# Patient Record
Sex: MALE | Race: BLACK OR AFRICAN AMERICAN | NOT HISPANIC OR LATINO | Employment: FULL TIME | URBAN - METROPOLITAN AREA
[De-identification: names, ages, dates, MRNs, and addresses within clinical notes are randomized per-mention and may not be internally consistent; named-entity substitution may affect disease eponyms.]

---

## 2017-01-24 VITALS
HEIGHT: 77 IN | DIASTOLIC BLOOD PRESSURE: 86 MMHG | SYSTOLIC BLOOD PRESSURE: 136 MMHG | TEMPERATURE: 95.8 F | BODY MASS INDEX: 37.19 KG/M2 | WEIGHT: 315 LBS | HEART RATE: 59 BPM | RESPIRATION RATE: 20 BRPM

## 2017-01-25 ENCOUNTER — OFFICE VISIT (OUTPATIENT)
Dept: BARIATRICS/WEIGHT MGMT | Facility: CLINIC | Age: 47
End: 2017-01-25

## 2017-01-25 VITALS
SYSTOLIC BLOOD PRESSURE: 120 MMHG | BODY MASS INDEX: 34.12 KG/M2 | DIASTOLIC BLOOD PRESSURE: 74 MMHG | OXYGEN SATURATION: 100 % | RESPIRATION RATE: 18 BRPM | WEIGHT: 289 LBS | HEIGHT: 77 IN | TEMPERATURE: 97.7 F | HEART RATE: 62 BPM

## 2017-01-25 DIAGNOSIS — R79.89 ELEVATED LFTS: ICD-10-CM

## 2017-01-25 DIAGNOSIS — Z98.84 STATUS POST BARIATRIC SURGERY: Primary | ICD-10-CM

## 2017-01-25 DIAGNOSIS — E66.01 MORBID OBESITY DUE TO EXCESS CALORIES (HCC): ICD-10-CM

## 2017-01-25 DIAGNOSIS — E55.9 VITAMIN D DEFICIENCY: ICD-10-CM

## 2017-01-25 DIAGNOSIS — I10 ESSENTIAL HYPERTENSION: ICD-10-CM

## 2017-01-25 DIAGNOSIS — R53.83 OTHER FATIGUE: ICD-10-CM

## 2017-01-25 PROCEDURE — 99214 OFFICE O/P EST MOD 30 MIN: CPT | Performed by: PHYSICIAN ASSISTANT

## 2017-01-25 RX ORDER — RANITIDINE 150 MG/1
150 CAPSULE ORAL EVERY EVENING
Qty: 30 CAPSULE | Refills: 0 | Status: SHIPPED | OUTPATIENT
Start: 2017-01-25 | End: 2017-02-24

## 2017-01-25 RX ORDER — OMEPRAZOLE 40 MG/1
CAPSULE, DELAYED RELEASE ORAL
COMMUNITY
Start: 2017-01-06 | End: 2017-12-18

## 2017-01-25 RX ORDER — URSODIOL 300 MG/1
CAPSULE ORAL
COMMUNITY
Start: 2017-01-24 | End: 2017-05-15

## 2017-01-25 RX ORDER — ERGOCALCIFEROL 1.25 MG/1
CAPSULE ORAL
COMMUNITY
Start: 2016-12-09 | End: 2017-12-18

## 2017-01-25 NOTE — MR AVS SNAPSHOT
Gerardo Verduzco   1/25/2017 9:00 AM   Office Visit    Dept Phone:  [phone]   Encounter #:  [id number]    Provider:  WALI Bautista   Department:  [facility] BARIATRIC SURGERY                Your Full Care Plan              Today's Medication Changes          These changes are accurate as of: 1/25/17  9:33 AM.  If you have any questions, ask your nurse or doctor.               New Medication(s)Ordered:     ranitidine 150 MG capsule   Commonly known as:  ZANTAC   Take 1 capsule by mouth Every Evening for 30 days.   Started by:  WALI Bautista         Stop taking medication(s)listed here:     metFORMIN  MG 24 hr tablet   Commonly known as:  GLUCOPHAGE-XR   Stopped by:  WALI Bautista                Where to Get Your Medications      These medications were sent to [facility] [address], [place] - [address] [facility] AT [facility] [address] - [phone]  - [phone] FX  [address] [facility] [address]     Phone:  [phone]     ranitidine 150 MG capsule                  Your Updated Medication List          This list is accurate as of: 1/25/17  9:33 AM.  Always use your most recent med list.                lisinopril 10 MG tablet   Commonly known as:  PRINIVIL,ZESTRIL   Take 1 tablet by mouth Daily.       MULTIVITAMIN ADULT PO       ranitidine 150 MG capsule   Commonly known as:  ZANTAC   Take 1 capsule by mouth Every Evening for 30 days.       Vitamin D (Cholecalciferol) 400 UNITS tablet   Commonly known as:  CHOLECALCIFEROL               We Performed the Following     CBC (No Diff)     Comprehensive Metabolic Panel     Ferritin     Folate     Iron     Methylmalonic Acid, Serum     Prealbumin     Vitamin B1, Whole Blood     Vitamin D 25 Hydroxy       You Were Diagnosed With        Codes Comments    Status post bariatric surgery    -  Primary ICD-10-CM: Z98.84  ICD-9-CM: V45.86     Morbid obesity due to excess calories      "ICD-10-CM: E66.01  ICD-9-CM: 278.01     Other fatigue     ICD-10-CM: R53.83  ICD-9-CM: 780.79     Elevated LFTs     ICD-10-CM: R79.89  ICD-9-CM: 790.6     Essential hypertension     ICD-10-CM: I10  ICD-9-CM: 401.9     Vitamin D deficiency     ICD-10-CM: E55.9  ICD-9-CM: 268.9       Instructions     None    Patient Instructions History      Upcoming Appointments     Visit Type Date Time Department    FOLLOW UP 2017  9:00 AM [facility]    FOLLOW UP 2017  9:30 AM [facility]      [facility] Signup     [facility] vzaar allows you to send messages to your doctor, view your test results, renew your prescriptions, schedule appointments, and more. To sign up, go to [employer] and click on the Sign Up Now link in the New User? box. Enter your vzaar Activation Code exactly as it appears below along with the last four digits of your Social Security Number and your Date of Birth () to complete the sign-up process. If you do not sign up before the expiration date, you must request a new code.    vzaar Activation Code: [email or web address]  Expires: 2017  9:31 AM    If you have questions, you can email [email or web address] or call [phone] to talk to our vzaar staff. Remember, vzaar is NOT to be used for urgent needs. For medical emergencies, dial 911.               Other Info from Your Visit           Your Appointments     2017  9:30 AM EDT   Follow Up with WALI Bautista   [facility] BARIATRIC SURGERY (--)    [address]  [facility] [address] [phone]           Arrive 15 minutes prior to appointment.              Allergies     Penicillins Allergy Swelling    throat      Vital Signs     Blood Pressure Pulse Temperature Respirations Height    120/74 (BP Location: Left arm, Patient Position: Sitting, Cuff Size: Large Adult) 62 97.7 °F (36.5 °C) (Temporal Artery ) 18 77\" (195.6 cm)    Weight Oxygen " Saturation Body Mass Index Smoking Status       289 lb (131 kg) 100% 34.27 kg/m2 Never Smoker       Problems and Diagnoses Noted     Diabetes    Elevated LFTs    Tiredness    Acid reflux disease    High cholesterol or triglycerides    High blood pressure    Joint pain    Status following surgery for weight loss    -  Primary    Severe obesity        Vitamin D deficiency

## 2017-01-25 NOTE — PROGRESS NOTES
[facility] BARIATRIC SURGERY  [address]  [facility] [address]  [phone]    Gerardo Verduzco.  1970    Date of visit: 1/25/2017    REASON FOR VISIT:  3 month postop      HPI: Gerardo Verduzco is a 46 y.o. male 3 months s/p LSG performed by Dr. Christiansen on 10/26/16.  Overall feeling good. His only issue is night time reflux. Had f/up w/PCP, took pt off all DM meds. Gerardo states that  he is eating  grams of protein per day. He reports eating 3 meals per day,2 snacks per day, drinking 64-oz. of water per day, no carbonated beverage consumption and exercising regularly. Gerardo is taking Omeprazole  and Actigall .   Taking  MVI, B12, B1, Vit D and iron.  1 month labs revealed low vit D , o/w okay. Pre-surgery weight: 362 lbs. Today's weight is 289 lb (131 kg) pounds, today's BMI is Body mass index is 34.27 kg/(m^2)., he has a  Loss of 28 pounds since the last visit and his weight loss since surgery is 73 pounds.      Past Medical History   Diagnosis Date   • Diabetes mellitus      resolved after WLS, DX 2013, on insulin 7 months, A1C 11.5 7/16   • Elevated LFTs      AST 47, 49   • Fatigue    • GERD (gastroesophageal reflux disease)      on Omeprazole controlled . EGD GDW 8/16 very thick mucosal folds, 45 cm, no HH. path DE suggestive of reflux   • Goiter      Undx   • Hyperlipidemia    • Hypertension    • Joint pain    • Morbid obesity    • Wears eyeglasses      Past Surgical History   Procedure Laterality Date   • Nasal septum surgery     • Appendectomy     • Colonoscopy       3 years ago   • Gastric sleeve laparoscopic N/A 10/26/2016     Procedure: GASTRIC SLEEVE LAPAROSCOPIC;  Surgeon: Abdifatah Christiansen MD;  Location: [facility];  Service:        Current Outpatient Prescriptions:   •  lisinopril (PRINIVIL,ZESTRIL) 10 MG tablet, Take 1 tablet by mouth Daily., Disp: 20 tablet, Rfl: 0  •  Multiple Vitamins-Minerals (MULTIVITAMIN ADULT PO), Take 1 tablet by mouth Daily., Disp: ,  "Rfl:   •  omeprazole (priLOSEC) 40 MG capsule, , Disp: , Rfl:   •  ranitidine (ZANTAC) 150 MG capsule, Take 1 capsule by mouth Every Evening for 30 days., Disp: 30 capsule, Rfl: 0  •  ursodiol (ACTIGALL) 300 MG capsule, , Disp: , Rfl:   •  vitamin D (ERGOCALCIFEROL) 68816 UNITS capsule capsule, , Disp: , Rfl:   •  Vitamin D, Cholecalciferol, (CHOLECALCIFEROL) 400 UNITS tablet, Take 400 Units by mouth Daily., Disp: , Rfl:   Allergies   Allergen Reactions   • Penicillins Swelling     throat     Social History     Social History   • Marital status: Single     Spouse name: N/A   • Number of children: N/A   • Years of education: N/A     Occupational History   • Not on file.     Social History Main Topics   • Smoking status: Never Smoker   • Smokeless tobacco: Never Used   • Alcohol use No   • Drug use: No   • Sexual activity: Not on file      Comment:      Other Topics Concern   • Not on file     Social History Narrative    Lives in [place] w/.     Working full time manager at [employer]     Review of Systems   General ROS: Negative for - fatigue  Ophthalmic ROS: Negative for - vision changes  ENT ROS: Negative for - swallowing difficulty  Gastrointestinal ROS: Negative for - abdominal pain, constipation, diarrhea, nausea/vomiting  Neurological ROS: Negative for - dizziness, numbness/tingling of extremities, memory loss  Dermatological ROS: Negative for - Hair loss     Physical Exam:  Visit Vitals   • /74 (BP Location: Left arm, Patient Position: Sitting, Cuff Size: Large Adult)   • Pulse 62   • Temp 97.7 °F (36.5 °C) (Temporal Artery )   • Resp 18   • Ht 77\" (195.6 cm)   • Wt 289 lb (131 kg)  Comment: pre-op weight 362 lbs   • SpO2 100%   • BMI 34.27 kg/m2        General Appearance:  Well nourished.  In no acute distress.  Patient was observed to be obese.  Oral Cavity:   Buccal Mucosa: The buccal mucosa was moist.  Lungs:  Normal breath sounds/voice " sounds.  Cardiovascular:   Heart Rate And Rhythm: Heart rate and rhythm normal.   Edema: No calf tenderness.  Abdomen: incisions well healed.   Auscultation: The bowel sounds were normal.   Palpation: No mass was palpated in the abdomen, Soft, nontender, and nondistended.   Hernia: No hernia was discovered.  Musculoskeletal System:   General/bilateral: No edema present in extremities.  Normal movement of all extremities.  Neurological:  Was alert and oriented.   Gait And Stance: Gait and stance were normal.  Psychiatric:   Attitude: The attitude was cooperative.   Mood: Mood pleasant.  Skin:  The complexion was normal.  The skin moisture was normal and the skin temperature was normal.    Assessment:   3 months s/p LSG, the patient is doing well.     ICD-10-CM ICD-9-CM   1. Status post bariatric surgery Z98.84 V45.86   2. Morbid obesity due to excess calories E66.01 278.01   3. Other fatigue R53.83 780.79   4. Elevated LFTs R79.89 790.6   5. Essential hypertension I10 401.9   6. Vitamin D deficiency E55.9 268.9     Plan:     Orders Placed This Encounter   Procedures   • CBC (No Diff)   • Comprehensive Metabolic Panel   • Ferritin   • Folate   • Iron   • Methylmalonic Acid, Serum   • Prealbumin   • Vitamin B1, Whole Blood   • Vitamin D 25 Hydroxy     Requested Prescriptions     Signed Prescriptions Disp Refills   • ranitidine (ZANTAC) 150 MG capsule 30 capsule 0     Sig: Take 1 capsule by mouth Every Evening for 30 days.     · Advised to take Zantac QPM, call if no improvement gerd sx.    Diet and Exercise: Diet history reviewed and discussed with the patient. Weight loss to date discussed with the patient. Recommended patient be sure to get at least 70 grams of protein per day by eating small, frequent meals all with high lean protein choices. Be sure to limit/cut back on daily carbohydrate intake. Discussed with the patient the recommended amount of water per day to intake- half of body weight in ounces. Reviewed  vitamin requirements. Be sure to do routine exercise, 150 minutes per week minimum, including both cardio and strength training.   • Recommended and instructed to call the office for concerns, questions, or problems.   • The patient was instructed to follow up in 3 months  note: approx 15 of the 25 minute visit was spent counseling on dietary/lifestyle modifications    Tesha Wagner, PAC  1/25/2017

## 2017-01-29 LAB
25(OH)D3+25(OH)D2 SERPL-MCNC: 32.4 NG/ML
ALBUMIN SERPL-MCNC: 4 G/DL (ref 3.2–4.8)
ALBUMIN/GLOB SERPL: 1.3 G/DL (ref 1.5–2.5)
ALP SERPL-CCNC: 60 U/L (ref 25–100)
ALT SERPL-CCNC: 24 U/L (ref 7–40)
AST SERPL-CCNC: 30 U/L (ref 0–33)
BILIRUB SERPL-MCNC: 0.5 MG/DL (ref 0.3–1.2)
BUN SERPL-MCNC: 18 MG/DL (ref 9–23)
BUN/CREAT SERPL: 18 (ref 7–25)
CALCIUM SERPL-MCNC: 10 MG/DL (ref 8.7–10.4)
CHLORIDE SERPL-SCNC: 105 MMOL/L (ref 99–109)
CO2 SERPL-SCNC: 34 MMOL/L (ref 20–31)
CREAT SERPL-MCNC: 1 MG/DL (ref 0.6–1.3)
ERYTHROCYTE [DISTWIDTH] IN BLOOD BY AUTOMATED COUNT: 13.8 % (ref 11.3–14.5)
FERRITIN SERPL-MCNC: 112 NG/ML (ref 22–322)
FOLATE SERPL-MCNC: 7.06 NG/ML (ref 3.2–20)
GLOBULIN SER CALC-MCNC: 3 GM/DL
GLUCOSE SERPL-MCNC: 105 MG/DL (ref 70–100)
HCT VFR BLD AUTO: 44.2 % (ref 38.9–50.9)
HGB BLD-MCNC: 14.3 G/DL (ref 13.1–17.5)
IRON SERPL-MCNC: 115 MCG/DL (ref 50–175)
MCH RBC QN AUTO: 29.5 PG (ref 27–31)
MCHC RBC AUTO-ENTMCNC: 32.4 G/DL (ref 32–36)
MCV RBC AUTO: 91.3 FL (ref 80–99)
METHYLMALONATE SERPL-SCNC: 193 NMOL/L (ref 0–378)
PLATELET # BLD AUTO: 135 10*3/MM3 (ref 150–450)
POTASSIUM SERPL-SCNC: 5.3 MMOL/L (ref 3.5–5.5)
PREALB SERPL-MCNC: 17 MG/DL (ref 12–34)
PROT SERPL-MCNC: 7 G/DL (ref 5.7–8.2)
RBC # BLD AUTO: 4.84 10*6/MM3 (ref 4.2–5.76)
SODIUM SERPL-SCNC: 143 MMOL/L (ref 132–146)
VIT B1 BLD-SCNC: 107 NMOL/L (ref 66.5–200)
WBC # BLD AUTO: 8.14 10*3/MM3 (ref 3.5–10.8)

## 2017-02-27 DIAGNOSIS — R10.13 DYSPEPSIA: Primary | ICD-10-CM

## 2017-02-27 RX ORDER — RANITIDINE 150 MG/1
150 TABLET ORAL 2 TIMES DAILY
Status: DISCONTINUED | OUTPATIENT
Start: 2017-02-27 | End: 2017-03-08

## 2017-03-08 DIAGNOSIS — R10.13 DYSPEPSIA: ICD-10-CM

## 2017-03-08 RX ORDER — RANITIDINE 150 MG/1
150 CAPSULE ORAL 2 TIMES DAILY
Qty: 60 CAPSULE | Refills: 2 | Status: SHIPPED | OUTPATIENT
Start: 2017-03-08 | End: 2017-10-05

## 2017-03-15 ENCOUNTER — TELEPHONE (OUTPATIENT)
Dept: BARIATRICS/WEIGHT MGMT | Facility: CLINIC | Age: 47
End: 2017-03-15

## 2017-03-15 NOTE — TELEPHONE ENCOUNTER
Pt called in and stated that he is working out heavily and getting tired in the process.  Pt is wanting to know if he can take a pre-workout powder before hand to help with the fatigue of exercising.  Please advise thanks

## 2017-03-20 NOTE — TELEPHONE ENCOUNTER
Spoke with patient advised we don't recommend any protein powders or supplements, but that he needed to make sure he was getting in his 100 g of protein per day. Advised patient that we could schedule him for a BMR test to make sure he was getting enough calories per day. Patient asked to have this done, transferred call to  to have BMR scheduled

## 2017-05-15 ENCOUNTER — OFFICE VISIT (OUTPATIENT)
Dept: BARIATRICS/WEIGHT MGMT | Facility: CLINIC | Age: 47
End: 2017-05-15

## 2017-05-15 VITALS
RESPIRATION RATE: 18 BRPM | TEMPERATURE: 97.7 F | HEIGHT: 77 IN | DIASTOLIC BLOOD PRESSURE: 74 MMHG | OXYGEN SATURATION: 99 % | SYSTOLIC BLOOD PRESSURE: 108 MMHG | HEART RATE: 88 BPM | BODY MASS INDEX: 29.81 KG/M2 | WEIGHT: 252.5 LBS

## 2017-05-15 DIAGNOSIS — R79.89 ELEVATED LFTS: ICD-10-CM

## 2017-05-15 DIAGNOSIS — R53.83 FATIGUE, UNSPECIFIED TYPE: Primary | ICD-10-CM

## 2017-05-15 DIAGNOSIS — E55.9 VITAMIN D DEFICIENCY: ICD-10-CM

## 2017-05-15 PROCEDURE — 99214 OFFICE O/P EST MOD 30 MIN: CPT | Performed by: PHYSICIAN ASSISTANT

## 2017-05-18 LAB
25(OH)D3+25(OH)D2 SERPL-MCNC: 19.1 NG/ML (ref 30–100)
ALBUMIN SERPL-MCNC: 3.9 G/DL (ref 3.5–5.5)
ALBUMIN/GLOB SERPL: 1.1 {RATIO} (ref 1.2–2.2)
ALP SERPL-CCNC: 69 IU/L (ref 39–117)
ALT SERPL-CCNC: 26 IU/L (ref 0–44)
AST SERPL-CCNC: 49 IU/L (ref 0–40)
BILIRUB SERPL-MCNC: 0.4 MG/DL (ref 0–1.2)
BUN SERPL-MCNC: 19 MG/DL (ref 6–24)
BUN/CREAT SERPL: 18 (ref 9–20)
CALCIUM SERPL-MCNC: 9.5 MG/DL (ref 8.7–10.2)
CHLORIDE SERPL-SCNC: 102 MMOL/L (ref 96–106)
CO2 SERPL-SCNC: 27 MMOL/L (ref 18–29)
CREAT SERPL-MCNC: 1.08 MG/DL (ref 0.76–1.27)
ERYTHROCYTE [DISTWIDTH] IN BLOOD BY AUTOMATED COUNT: 13.8 % (ref 12.3–15.4)
FERRITIN SERPL-MCNC: 121 NG/ML (ref 30–400)
FOLATE SERPL-MCNC: 8 NG/ML
GLOBULIN SER CALC-MCNC: 3.5 G/DL (ref 1.5–4.5)
GLUCOSE SERPL-MCNC: 90 MG/DL (ref 65–99)
HCT VFR BLD AUTO: 43.9 % (ref 37.5–51)
HGB BLD-MCNC: 14.8 G/DL (ref 12.6–17.7)
IRON SERPL-MCNC: 85 UG/DL (ref 38–169)
MCH RBC QN AUTO: 29.2 PG (ref 26.6–33)
MCHC RBC AUTO-ENTMCNC: 33.7 G/DL (ref 31.5–35.7)
MCV RBC AUTO: 87 FL (ref 79–97)
METHYLMALONATE SERPL-SCNC: 308 NMOL/L (ref 0–378)
PLATELET # BLD AUTO: 108 X10E3/UL (ref 150–379)
POTASSIUM SERPL-SCNC: 5.1 MMOL/L (ref 3.5–5.2)
PREALB SERPL-MCNC: 21 MG/DL (ref 12–34)
PROT SERPL-MCNC: 7.4 G/DL (ref 6–8.5)
RBC # BLD AUTO: 5.07 X10E6/UL (ref 4.14–5.8)
SODIUM SERPL-SCNC: 143 MMOL/L (ref 134–144)
VIT B1 BLD-SCNC: 96.2 NMOL/L (ref 66.5–200)
WBC # BLD AUTO: 9.4 X10E3/UL (ref 3.4–10.8)

## 2017-05-19 RX ORDER — ERGOCALCIFEROL 1.25 MG/1
50000 CAPSULE ORAL WEEKLY
Qty: 12 CAPSULE | Refills: 0 | Status: SHIPPED | OUTPATIENT
Start: 2017-05-19 | End: 2017-12-18

## 2017-06-23 RX ORDER — URSODIOL 300 MG/1
300 CAPSULE ORAL 2 TIMES DAILY
Qty: 60 CAPSULE | Refills: 0 | OUTPATIENT
Start: 2017-06-23

## 2017-06-23 NOTE — TELEPHONE ENCOUNTER
Contacted pt and let him know that the rx will not be refilled.  Pt stated he felt he didn't need it, he just thought he was supposed to take it.  I stated to pt that if he has any problems not having this med. To contact our office for possible further eval.  Pt verbalized understanding.

## 2017-07-26 ENCOUNTER — TELEPHONE (OUTPATIENT)
Dept: BARIATRICS/WEIGHT MGMT | Facility: CLINIC | Age: 47
End: 2017-07-26

## 2017-07-26 RX ORDER — RANITIDINE 150 MG/1
150 TABLET ORAL 2 TIMES DAILY
Qty: 180 TABLET | Refills: 0 | Status: SHIPPED | OUTPATIENT
Start: 2017-07-26 | End: 2017-10-05 | Stop reason: SDUPTHER

## 2017-10-05 ENCOUNTER — TELEPHONE (OUTPATIENT)
Dept: BARIATRICS/WEIGHT MGMT | Facility: CLINIC | Age: 47
End: 2017-10-05

## 2017-10-05 RX ORDER — RANITIDINE 150 MG/1
150 TABLET ORAL 2 TIMES DAILY
Qty: 60 TABLET | Refills: 0 | Status: SHIPPED | OUTPATIENT
Start: 2017-10-05 | End: 2017-11-04

## 2017-12-18 ENCOUNTER — OFFICE VISIT (OUTPATIENT)
Dept: BARIATRICS/WEIGHT MGMT | Facility: CLINIC | Age: 47
End: 2017-12-18

## 2017-12-18 VITALS
BODY MASS INDEX: 26.39 KG/M2 | TEMPERATURE: 97.7 F | OXYGEN SATURATION: 99 % | SYSTOLIC BLOOD PRESSURE: 119 MMHG | RESPIRATION RATE: 18 BRPM | HEIGHT: 77 IN | WEIGHT: 223.5 LBS | DIASTOLIC BLOOD PRESSURE: 66 MMHG | HEART RATE: 50 BPM

## 2017-12-18 DIAGNOSIS — I10 ESSENTIAL HYPERTENSION: ICD-10-CM

## 2017-12-18 DIAGNOSIS — Z13.0 SCREENING, IRON DEFICIENCY ANEMIA: ICD-10-CM

## 2017-12-18 DIAGNOSIS — R10.13 DYSPEPSIA: ICD-10-CM

## 2017-12-18 DIAGNOSIS — K91.2 POSTGASTRECTOMY MALABSORPTION: ICD-10-CM

## 2017-12-18 DIAGNOSIS — Z98.84 STATUS POST BARIATRIC SURGERY: ICD-10-CM

## 2017-12-18 DIAGNOSIS — R53.83 FATIGUE, UNSPECIFIED TYPE: Primary | ICD-10-CM

## 2017-12-18 DIAGNOSIS — Z13.21 MALNUTRITION SCREEN: ICD-10-CM

## 2017-12-18 DIAGNOSIS — E55.9 VITAMIN D DEFICIENCY: ICD-10-CM

## 2017-12-18 DIAGNOSIS — Z90.3 POSTGASTRECTOMY MALABSORPTION: ICD-10-CM

## 2017-12-18 PROCEDURE — 99214 OFFICE O/P EST MOD 30 MIN: CPT | Performed by: SURGERY

## 2017-12-18 NOTE — PROGRESS NOTES
[facility] Bariatric Surgery  [address]  [facility] [address]  [phone]        Patient Name:  Gerardo Verduzco.  :  1970      Date of Visit: 2017      Reason for Visit:   1 years postop    HPI: Gerardo Verduzco is a 47 y.o. male s/p LSG performed by Dr. Christiansen on 10/26/16    Doing well.  No issues/concerns. Denies dysphagia, reflux, nausea, vomiting and abdominal pain.  Getting 90-100g prot/day.  Drinking 64 fluid oz/day.  6 month labs revealed low vitamin D and he finished 3 months of vitamin D replacement. Taking MVI, B12, B1, Calcium, Vit D, iron and Vit C.  On PRN Tums.  Exercising: works out with a  5 days per week.     Presurgery weight: 362 pounds.  Today's weight is 101 kg (223 lb 8 oz) pounds, today's  Body mass index is 26.5 kg/(m^2)., and his weight loss since surgery is 139 pounds.      Past Medical History:   Diagnosis Date   • Diabetes mellitus     resolved after WLS, DX , on insulin 7 months, A1C 11.5    • Diabetes mellitus type 2 in obese     DX , ON INSULIN 7 MONTHS, a1c 11.5    • Elevated LFTs     AST 47, 49   • Fatigue    • GERD (gastroesophageal reflux disease)     on Omeprazole controlled . EGD GDW  very thick mucosal folds, 45 cm, no HH. path DE suggestive of reflux   • Goiter     Undx   • Goiter     undx   • Hyperlipidemia    • Hypertension    • Joint pain    • Morbid obesity    • Wears eyeglasses      Past Surgical History:   Procedure Laterality Date   • APPENDECTOMY      (open)   • COLONOSCOPY      3 years ago   • GASTRIC SLEEVE LAPAROSCOPIC N/A 10/26/2016    Procedure: GASTRIC SLEEVE LAPAROSCOPIC;  Surgeon: Abdifatah Christiansen MD;  Location: [facility];  Service:    • NASAL SEPTUM SURGERY      deviated septum     Outpatient Prescriptions Marked as Taking for the 17 encounter (Office Visit) with Bozena Lim MD   Medication Sig Dispense Refill   • Multiple Vitamins-Minerals  "(MULTIVITAMIN ADULT PO) Take 1 tablet by mouth Daily.     • [DISCONTINUED] omeprazole (priLOSEC) 40 MG capsule          Allergies   Allergen Reactions   • Penicillins Swelling     throat       Social History     Social History   • Marital status: Single     Spouse name: N/A   • Number of children: N/A   • Years of education: N/A     Occupational History   • Not on file.     Social History Main Topics   • Smoking status: Never Smoker   • Smokeless tobacco: Never Used   • Alcohol use No   • Drug use: No   • Sexual activity: Not on file      Comment:      Other Topics Concern   • Not on file     Social History Narrative    Lives in [place] w/.     Working full time manager at [employer]        /66 (BP Location: Left arm, Patient Position: Sitting, Cuff Size: Large Adult)  Pulse 50  Temp 97.7 °F (36.5 °C) (Temporal Artery )   Resp 18  Ht 195.6 cm (77\")  Wt 101 kg (223 lb 8 oz)  SpO2 99%  BMI 26.5 kg/m2    Physical Exam   Constitutional: He is oriented to person, place, and time. He appears well-developed and well-nourished.   HENT:   Head: Normocephalic and atraumatic.   Mouth/Throat: No oropharyngeal exudate.   Eyes: Conjunctivae and EOM are normal. Pupils are equal, round, and reactive to light.   Pulmonary/Chest: Effort normal. No respiratory distress.   Abdominal: Soft. He exhibits no distension. There is no tenderness.   Incisions well-healed, loose skin   Neurological: He is alert and oriented to person, place, and time. No cranial nerve deficit.   Skin: Skin is warm and dry. He is not diaphoretic. No erythema. No pallor.   Psychiatric: He has a normal mood and affect. His behavior is normal. Judgment and thought content normal.         Assessment:   1 years s/p LSG performed by Dr. Christiansen on 10/26/16    ICD-10-CM ICD-9-CM   1. Fatigue, unspecified type R53.83 780.79   2. Dyspepsia R10.13 536.8   3. Status post bariatric surgery Z98.84 V45.86   4. Essential " hypertension I10 401.9   5. Vitamin D deficiency E55.9 268.9   6. Screening, iron deficiency anemia Z13.0 V78.0   7. Postgastrectomy malabsorption K91.2 579.3    Z90.3    8. Malnutrition screen Z13.21 V77.2         Plan:  Doing well. Continue w/ good food choices and healthy habits.  Continue protein >70g/day.  Continue routine exercise.  Routine bariatric labs ordered.  Continue vitamins w/ adjustments pending lab results.  Call w/ problems/concerns.     The patient was instructed to follow up in 1 year, sooner if needed.    note: approx 15 of the 25 minute visit was spent counseling on nutrition and necessary dietary/lifestyle modifications.    Bozena Lim MD

## 2017-12-23 LAB
25(OH)D3+25(OH)D2 SERPL-MCNC: 19 NG/ML (ref 30–100)
A-TOCOPHEROL VIT E SERPL-MCNC: 10 MG/L (ref 5.3–17.5)
ALBUMIN SERPL-MCNC: 4.2 G/DL (ref 3.5–5.5)
ALBUMIN/GLOB SERPL: 1.3 {RATIO} (ref 1.2–2.2)
ALP SERPL-CCNC: 57 IU/L (ref 39–117)
ALT SERPL-CCNC: 18 IU/L (ref 0–44)
AST SERPL-CCNC: 27 IU/L (ref 0–40)
BASOPHILS # BLD AUTO: 0 X10E3/UL (ref 0–0.2)
BASOPHILS NFR BLD AUTO: 1 %
BILIRUB SERPL-MCNC: 0.5 MG/DL (ref 0–1.2)
BUN SERPL-MCNC: 16 MG/DL (ref 6–24)
BUN/CREAT SERPL: 18 (ref 9–20)
CALCIUM SERPL-MCNC: 8.9 MG/DL (ref 8.7–10.2)
CHLORIDE SERPL-SCNC: 101 MMOL/L (ref 96–106)
CO2 SERPL-SCNC: 26 MMOL/L (ref 18–29)
CREAT SERPL-MCNC: 0.91 MG/DL (ref 0.76–1.27)
EOSINOPHIL # BLD AUTO: 0 X10E3/UL (ref 0–0.4)
EOSINOPHIL NFR BLD AUTO: 1 %
ERYTHROCYTE [DISTWIDTH] IN BLOOD BY AUTOMATED COUNT: 13.6 % (ref 12.3–15.4)
FERRITIN SERPL-MCNC: 185 NG/ML (ref 30–400)
FOLATE SERPL-MCNC: 6.8 NG/ML
GLOBULIN SER CALC-MCNC: 3.2 G/DL (ref 1.5–4.5)
GLUCOSE SERPL-MCNC: 78 MG/DL (ref 65–99)
HCT VFR BLD AUTO: 43.1 % (ref 37.5–51)
HGB BLD-MCNC: 14.5 G/DL (ref 13–17.7)
IMM GRANULOCYTES # BLD: 0 X10E3/UL (ref 0–0.1)
IMM GRANULOCYTES NFR BLD: 0 %
IRON SERPL-MCNC: 114 UG/DL (ref 38–169)
LYMPHOCYTES # BLD AUTO: 3 X10E3/UL (ref 0.7–3.1)
LYMPHOCYTES NFR BLD AUTO: 61 %
MAGNESIUM SERPL-MCNC: 2.5 MG/DL (ref 1.6–2.3)
MCH RBC QN AUTO: 28.9 PG (ref 26.6–33)
MCHC RBC AUTO-ENTMCNC: 33.6 G/DL (ref 31.5–35.7)
MCV RBC AUTO: 86 FL (ref 79–97)
METHYLMALONATE SERPL-SCNC: 132 NMOL/L (ref 0–378)
MONOCYTES # BLD AUTO: 0.5 X10E3/UL (ref 0.1–0.9)
MONOCYTES NFR BLD AUTO: 10 %
MORPHOLOGY BLD-IMP: (no result)
NEUTROPHILS # BLD AUTO: 1.3 X10E3/UL (ref 1.4–7)
NEUTROPHILS NFR BLD AUTO: 27 %
PHOSPHATE SERPL-MCNC: 3.4 MG/DL (ref 2.5–4.5)
PLATELET # BLD AUTO: (no result) X10E3/UL
POTASSIUM SERPL-SCNC: 4.2 MMOL/L (ref 3.5–5.2)
PREALB SERPL-MCNC: 17 MG/DL (ref 12–34)
PROT SERPL-MCNC: 7.4 G/DL (ref 6–8.5)
PTH-INTACT SERPL-MCNC: 33 PG/ML (ref 15–65)
RBC # BLD AUTO: 5.02 X10E6/UL (ref 4.14–5.8)
SODIUM SERPL-SCNC: 146 MMOL/L (ref 134–144)
VIT A SERPL-MCNC: 33 UG/DL (ref 24–85)
VIT B1 BLD-SCNC: 73.9 NMOL/L (ref 66.5–200)
WBC # BLD AUTO: 4.9 X10E3/UL (ref 3.4–10.8)
ZINC SERPL-MCNC: 82 UG/DL (ref 56–134)

## 2018-01-02 RX ORDER — ERGOCALCIFEROL 1.25 MG/1
50000 CAPSULE ORAL WEEKLY
Qty: 12 CAPSULE | Refills: 0 | Status: SHIPPED | OUTPATIENT
Start: 2018-01-02 | End: 2019-02-20 | Stop reason: SDUPTHER

## 2018-06-12 ENCOUNTER — TELEPHONE (OUTPATIENT)
Dept: BARIATRICS/WEIGHT MGMT | Facility: CLINIC | Age: 48
End: 2018-06-12

## 2018-06-12 RX ORDER — OMEPRAZOLE 20 MG/1
20 CAPSULE, DELAYED RELEASE ORAL DAILY
Qty: 90 CAPSULE | Refills: 1 | Status: SHIPPED | OUTPATIENT
Start: 2018-06-12 | End: 2018-09-10

## 2018-06-12 NOTE — TELEPHONE ENCOUNTER
Pt called and stated that he is having really bad acid reflux and was wanting a rx for it. He states he used to take omeprazole. Please advise. Thanks.

## 2018-11-06 ENCOUNTER — TELEPHONE (OUTPATIENT)
Dept: BARIATRICS/WEIGHT MGMT | Facility: CLINIC | Age: 48
End: 2018-11-06

## 2018-11-06 RX ORDER — OMEPRAZOLE 40 MG/1
40 CAPSULE, DELAYED RELEASE ORAL DAILY
Qty: 30 CAPSULE | Refills: 3 | Status: SHIPPED | OUTPATIENT
Start: 2018-11-06 | End: 2019-02-14

## 2018-11-06 NOTE — TELEPHONE ENCOUNTER
Notified pt that the refill for omeprazole has been sent to his pharmacy.  Pt verbalized understanding.

## 2019-02-14 ENCOUNTER — OFFICE VISIT (OUTPATIENT)
Dept: BARIATRICS/WEIGHT MGMT | Facility: CLINIC | Age: 49
End: 2019-02-14

## 2019-02-14 VITALS
HEART RATE: 65 BPM | WEIGHT: 235 LBS | BODY MASS INDEX: 27.75 KG/M2 | RESPIRATION RATE: 18 BRPM | DIASTOLIC BLOOD PRESSURE: 68 MMHG | TEMPERATURE: 97.7 F | OXYGEN SATURATION: 99 % | HEIGHT: 77 IN | SYSTOLIC BLOOD PRESSURE: 116 MMHG

## 2019-02-14 DIAGNOSIS — Z98.84 STATUS POST BARIATRIC SURGERY: Primary | ICD-10-CM

## 2019-02-14 DIAGNOSIS — Z13.21 MALNUTRITION SCREEN: ICD-10-CM

## 2019-02-14 DIAGNOSIS — K91.2 POSTGASTRECTOMY MALABSORPTION: ICD-10-CM

## 2019-02-14 DIAGNOSIS — Z90.3 POSTGASTRECTOMY MALABSORPTION: ICD-10-CM

## 2019-02-14 DIAGNOSIS — Z13.0 SCREENING, IRON DEFICIENCY ANEMIA: ICD-10-CM

## 2019-02-14 DIAGNOSIS — K21.9 GASTROESOPHAGEAL REFLUX DISEASE, ESOPHAGITIS PRESENCE NOT SPECIFIED: ICD-10-CM

## 2019-02-14 DIAGNOSIS — R53.83 OTHER FATIGUE: ICD-10-CM

## 2019-02-14 DIAGNOSIS — E55.9 HYPOVITAMINOSIS D: ICD-10-CM

## 2019-02-14 PROCEDURE — 99214 OFFICE O/P EST MOD 30 MIN: CPT | Performed by: PHYSICIAN ASSISTANT

## 2019-02-14 RX ORDER — OMEPRAZOLE 40 MG/1
40 CAPSULE, DELAYED RELEASE ORAL 2 TIMES DAILY
Qty: 60 CAPSULE | Refills: 2 | Status: SHIPPED | OUTPATIENT
Start: 2019-02-14 | End: 2019-06-20 | Stop reason: SDUPTHER

## 2019-02-14 NOTE — PROGRESS NOTES
[facility] Bariatric Surgery  [address]  [facility] [id number] [phone]        Patient Name:  Gerardo Verduzco.  :  1970        Reason for Visit:   Annual Eval  2y 4months s/p LSG    HPI: Gerardo Verduzco is a 48 y.o. male s/p LSG performed by Dr. Christiansen on 10/26/16    LOV 2017- doing well    Doing very well, pleased with his progress.   Has noticed reflux several months ago, seemed to get better but the last month has returned and has worsened. Reflux noted every night after lying down. Waking him up at night. Does not have issues during the day. He is taking omeprazole 40mg daily.  Denies dysphagia, nausea, vomiting and abdominal pain.  Getting 90-100g prot/day, shakes and protein bars. Eating minimal carbs. Eating 2-3 meals a days, focusing on meats.   Drinking 64+ fluid oz/day.  Last labs revealed low vitamin D (19), prealbumin 17, advised 100g protein.  .  Taking MVI, took weekly D x 3 months, not on daily D.  On Omeprazole .  Exercising 7 days a week, cardio/ a lot of lifting with a .      Presurgery weight: 362 pounds.  Today's weight is 107 kg (235 lb) pounds, today's  Body mass index is 27.87 kg/m²., and his weight loss since surgery is 127 pounds.      Past Medical History:   Diagnosis Date   • Diabetes mellitus (CMS/HCC)     resolved after WLS, DX , on insulin 7 months, A1C 11.5    • Diabetes mellitus type 2 in obese (CMS/HCC)     DX , ON INSULIN 7 MONTHS, a1c 11.5    • Elevated LFTs     AST 47, 49   • Fatigue    • GERD (gastroesophageal reflux disease)     on Omeprazole controlled . EGD GDW  very thick mucosal folds, 45 cm, no HH. path DE suggestive of reflux   • Goiter     Undx   • Goiter     undx   • Hyperlipidemia    • Hypertension    • Joint pain    • Morbid obesity (CMS/HCC)    • Wears eyeglasses      Past Surgical History:   Procedure Laterality Date   • APPENDECTOMY      (open)   • COLONOSCOPY      3 years ago   • GASTRIC  "SLEEVE LAPAROSCOPIC N/A 10/26/2016    Performed by Abdifatah Christiansen MD at [facility] OR   • NASAL SEPTUM SURGERY  2016    deviated septum     Outpatient Medications Marked as Taking for the 2/14/19 encounter (Office Visit) with Jade Morillo PA-C   Medication Sig Dispense Refill   • Multiple Vitamins-Minerals (MULTIVITAMIN ADULT PO) Take 1 tablet by mouth Daily.     • vitamin D (ERGOCALCIFEROL) 13004 units capsule capsule Take 1 capsule by mouth 1 (One) Time Per Week. 12 capsule 0   • [DISCONTINUED] omeprazole (priLOSEC) 40 MG capsule Take 1 capsule by mouth Daily. 30 capsule 3       Allergies   Allergen Reactions   • Penicillins Swelling     throat       Social History     Socioeconomic History   • Marital status: Single     Spouse name: Not on file   • Number of children: Not on file   • Years of education: Not on file   • Highest education level: Not on file   Social Needs   • Financial resource strain: Not on file   • Food insecurity - worry: Not on file   • Food insecurity - inability: Not on file   • Transportation needs - medical: Not on file   • Transportation needs - non-medical: Not on file   Occupational History   • Not on file   Tobacco Use   • Smoking status: Never Smoker   • Smokeless tobacco: Never Used   Substance and Sexual Activity   • Alcohol use: No   • Drug use: No   • Sexual activity: Not on file     Comment:    Other Topics Concern   • Not on file   Social History Narrative    Lives in [place] w/.     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 He appears well-developed and well-nourished.   HENT:   Head: Normocephalic and atraumatic.   Cardiovascular: " Normal rate, regular rhythm and normal heart sounds.   Pulmonary/Chest: Effort normal and breath sounds normal. No respiratory distress. He has no wheezes.   Abdominal: Soft. Bowel sounds are normal. He exhibits no distension. There is no guarding.   Incisions healing well   Neurological: He is alert and oriented to person, place, and time.   Skin: Skin is warm and dry.   Psychiatric: He has a normal mood and affect. His behavior is normal. Judgment and thought content normal.         Assessment:  2+ years s/p LSG performed by Dr. Christiansen on 10/26/16    ICD-10-CM ICD-9-CM   1. Status post bariatric surgery Z98.84 V45.86   2. Other fatigue R53.83 780.79   3. Gastroesophageal reflux disease, esophagitis presence not specified K21.9 530.81   4. Malnutrition screen Z13.21 V77.2   5. Screening, iron deficiency anemia Z13.0 V78.0   6. Hypovitaminosis D E55.9 268.9   7. Postgastrectomy malabsorption K91.2 579.3    Z90.3          Plan:  Will evaluate reflux with UGI, likely followed by EGD.  Increase PPI to BID. Continue w/ good food choices and healthy habits.  Continue to focus on high protein, low carb.  Keep tracking intake.  Continue routine exercise.  Routine bariatric labs ordered.  Continue vitamins w/ adjustments pending lab results, likely need Vit D.   Call w/ problems/concerns.     The patient was instructed to follow up in 1 year for annual eval, sooner if needed.      Total time spent w/ patient 25 minutes and 15 minutes spent counseling the patient on nutrition and necessary dietary/lifestyle modifications.

## 2019-02-17 LAB
25(OH)D3+25(OH)D2 SERPL-MCNC: 13.2 NG/ML (ref 30–100)
ALBUMIN SERPL-MCNC: 4.3 G/DL (ref 3.5–5.5)
ALBUMIN/GLOB SERPL: 1.5 {RATIO} (ref 1.2–2.2)
ALP SERPL-CCNC: 59 IU/L (ref 39–117)
ALT SERPL-CCNC: 16 IU/L (ref 0–44)
AST SERPL-CCNC: 23 IU/L (ref 0–40)
BASOPHILS # BLD AUTO: 0 X10E3/UL (ref 0–0.2)
BASOPHILS NFR BLD AUTO: 0 %
BILIRUB SERPL-MCNC: 0.3 MG/DL (ref 0–1.2)
BUN SERPL-MCNC: 18 MG/DL (ref 6–24)
BUN/CREAT SERPL: 16 (ref 9–20)
CALCIUM SERPL-MCNC: 9.4 MG/DL (ref 8.7–10.2)
CHLORIDE SERPL-SCNC: 106 MMOL/L (ref 96–106)
CO2 SERPL-SCNC: 25 MMOL/L (ref 20–29)
CREAT SERPL-MCNC: 1.13 MG/DL (ref 0.76–1.27)
EOSINOPHIL # BLD AUTO: 0.1 X10E3/UL (ref 0–0.4)
EOSINOPHIL NFR BLD AUTO: 1 %
ERYTHROCYTE [DISTWIDTH] IN BLOOD BY AUTOMATED COUNT: 13.2 % (ref 12.3–15.4)
FERRITIN SERPL-MCNC: 60 NG/ML (ref 30–400)
FOLATE SERPL-MCNC: 9.8 NG/ML
GLOBULIN SER CALC-MCNC: 2.9 G/DL (ref 1.5–4.5)
GLUCOSE SERPL-MCNC: 77 MG/DL (ref 65–99)
HCT VFR BLD AUTO: 43.3 % (ref 37.5–51)
HGB BLD-MCNC: 14.6 G/DL (ref 13–17.7)
IMM GRANULOCYTES # BLD AUTO: 0 X10E3/UL (ref 0–0.1)
IMM GRANULOCYTES NFR BLD AUTO: 0 %
IRON SERPL-MCNC: 122 UG/DL (ref 38–169)
LYMPHOCYTES # BLD AUTO: 3.5 X10E3/UL (ref 0.7–3.1)
LYMPHOCYTES NFR BLD AUTO: 42 %
Lab: NORMAL
MCH RBC QN AUTO: 29.2 PG (ref 26.6–33)
MCHC RBC AUTO-ENTMCNC: 33.7 G/DL (ref 31.5–35.7)
MCV RBC AUTO: 87 FL (ref 79–97)
METHYLMALONATE SERPL-SCNC: 224 NMOL/L (ref 0–378)
MONOCYTES # BLD AUTO: 0.8 X10E3/UL (ref 0.1–0.9)
MONOCYTES NFR BLD AUTO: 9 %
NEUTROPHILS # BLD AUTO: 3.9 X10E3/UL (ref 1.4–7)
NEUTROPHILS NFR BLD AUTO: 48 %
PLATELET # BLD AUTO: 138 X10E3/UL (ref 150–379)
POTASSIUM SERPL-SCNC: 5.2 MMOL/L (ref 3.5–5.2)
PREALB SERPL-MCNC: 21 MG/DL (ref 12–34)
PROT SERPL-MCNC: 7.2 G/DL (ref 6–8.5)
RBC # BLD AUTO: 5 X10E6/UL (ref 4.14–5.8)
SODIUM SERPL-SCNC: 145 MMOL/L (ref 134–144)
VIT B1 BLD-SCNC: 126.1 NMOL/L (ref 66.5–200)
WBC # BLD AUTO: 8.2 X10E3/UL (ref 3.4–10.8)

## 2019-02-20 RX ORDER — ERGOCALCIFEROL 1.25 MG/1
50000 CAPSULE ORAL WEEKLY
Qty: 12 CAPSULE | Refills: 0 | Status: SHIPPED | OUTPATIENT
Start: 2019-02-20

## 2019-06-20 ENCOUNTER — TELEPHONE (OUTPATIENT)
Dept: BARIATRICS/WEIGHT MGMT | Facility: CLINIC | Age: 49
End: 2019-06-20

## 2019-06-20 RX ORDER — OMEPRAZOLE 40 MG/1
40 CAPSULE, DELAYED RELEASE ORAL 2 TIMES DAILY
Qty: 60 CAPSULE | Refills: 3 | Status: SHIPPED | OUTPATIENT
Start: 2019-06-20 | End: 2019-09-09 | Stop reason: SDUPTHER

## 2019-06-20 NOTE — TELEPHONE ENCOUNTER
Pt called requesting a refill on his Omeprazole 40mg, he stated that he has ran out of refills. Please advise, thank you.

## 2019-06-20 NOTE — TELEPHONE ENCOUNTER
Notified pt that you refilled his Omeprazole 40mg and I asked the pt if it was controlling his symptoms, and he stated that it was. I also asked pt about his UGI and he said he forgot about it and will call and reschedule.

## 2019-09-09 ENCOUNTER — TELEPHONE (OUTPATIENT)
Dept: BARIATRICS/WEIGHT MGMT | Facility: CLINIC | Age: 49
End: 2019-09-09

## 2019-09-09 RX ORDER — OMEPRAZOLE 40 MG/1
40 CAPSULE, DELAYED RELEASE ORAL 2 TIMES DAILY
Qty: 60 CAPSULE | Refills: 3 | Status: SHIPPED | OUTPATIENT
Start: 2019-09-09 | End: 2020-01-08 | Stop reason: SDUPTHER

## 2020-01-08 ENCOUNTER — TELEPHONE (OUTPATIENT)
Dept: BARIATRICS/WEIGHT MGMT | Facility: CLINIC | Age: 50
End: 2020-01-08

## 2020-01-08 RX ORDER — OMEPRAZOLE 40 MG/1
40 CAPSULE, DELAYED RELEASE ORAL 2 TIMES DAILY
Qty: 60 CAPSULE | Refills: 3 | Status: SHIPPED | OUTPATIENT
Start: 2020-01-08 | End: 2020-05-07 | Stop reason: SDUPTHER

## 2020-04-30 RX ORDER — OMEPRAZOLE 40 MG/1
40 CAPSULE, DELAYED RELEASE ORAL 2 TIMES DAILY
Qty: 60 CAPSULE | Refills: 3 | OUTPATIENT
Start: 2020-04-30

## 2020-04-30 NOTE — TELEPHONE ENCOUNTER
Notified pt that he will need to schedule a video visit prior to refilling his rx. Pt verbalized understanding, and is scheduling with Sandra montenegro.

## 2020-05-07 ENCOUNTER — TELEMEDICINE (OUTPATIENT)
Dept: BARIATRICS/WEIGHT MGMT | Facility: CLINIC | Age: 50
End: 2020-05-07

## 2020-05-07 DIAGNOSIS — Z13.0 SCREENING, IRON DEFICIENCY ANEMIA: ICD-10-CM

## 2020-05-07 DIAGNOSIS — Z98.84 STATUS POST BARIATRIC SURGERY: Primary | ICD-10-CM

## 2020-05-07 DIAGNOSIS — K21.9 GASTROESOPHAGEAL REFLUX DISEASE, ESOPHAGITIS PRESENCE NOT SPECIFIED: ICD-10-CM

## 2020-05-07 DIAGNOSIS — E55.9 HYPOVITAMINOSIS D: ICD-10-CM

## 2020-05-07 DIAGNOSIS — Z13.21 MALNUTRITION SCREEN: ICD-10-CM

## 2020-05-07 DIAGNOSIS — K91.2 POSTGASTRECTOMY MALABSORPTION: ICD-10-CM

## 2020-05-07 DIAGNOSIS — Z90.3 POSTGASTRECTOMY MALABSORPTION: ICD-10-CM

## 2020-05-07 PROCEDURE — 99214 OFFICE O/P EST MOD 30 MIN: CPT | Performed by: SURGERY

## 2020-05-07 RX ORDER — OMEPRAZOLE 40 MG/1
40 CAPSULE, DELAYED RELEASE ORAL 2 TIMES DAILY
Qty: 180 CAPSULE | Refills: 3 | Status: SHIPPED | OUTPATIENT
Start: 2020-05-07

## 2020-05-07 NOTE — PROGRESS NOTES
[facility]  [address]  [address]  [facility] [address]  [phone]        Patient Name:  Gerardo Verduzco.  :  1970      Date of Visit: 2020      Reason for Visit:   3.5 years postop      HPI: Gerardo Verduzco is a 49 y.o. male s/p Dr. Christiansen on 10/26/16    +GERD that is controlled on PPI, but he needs a refill.      Doing well.  No major issues/concerns. Denies dysphagia, nausea, vomiting and abdominal pain.  Getting 100g prot/day.  Drinking 64 fluid oz/day. Last labs revealed low B12, low vitamin D, low sodium, low platelets. Taking MVI.  On Omeprazole .  Exercise: works out 1 hour daily.       Presurgery weight: 362 pounds.  Today's weight is 248 pounds, today's  There is no height or weight on file to calculate BMI., and@ weight loss since surgery is 114 pounds.      Past Medical History:   Diagnosis Date   • Diabetes mellitus (CMS/HCC)     resolved after WLS, DX , on insulin 7 months, A1C 11.5    • Diabetes mellitus type 2 in obese (CMS/HCC)     DX , ON INSULIN 7 MONTHS, a1c 11.5    • Elevated LFTs     AST 47, 49   • Fatigue    • GERD (gastroesophageal reflux disease)     on Omeprazole controlled . EGD GDW  very thick mucosal folds, 45 cm, no HH. path DE suggestive of reflux   • Goiter     Undx   • Goiter     undx   • Hyperlipidemia    • Hypertension    • Joint pain    • Morbid obesity (CMS/HCC)    • Wears eyeglasses      Past Surgical History:   Procedure Laterality Date   • APPENDECTOMY      (open)   • COLONOSCOPY      3 years ago   • GASTRIC SLEEVE LAPAROSCOPIC N/A 10/26/2016    Procedure: GASTRIC SLEEVE LAPAROSCOPIC;  Surgeon: Abdifatah Christiansen MD;  Location: [facility];  Service:    • NASAL SEPTUM SURGERY      deviated septum     No outpatient medications have been marked as taking for the 20 encounter (Telemedicine) with Bozena Lim MD.       Allergies   Allergen Reactions   • Penicillins Swelling     throat        Social History     Socioeconomic History   • Marital status:      Spouse name: Not on file   • Number of children: Not on file   • Years of education: Not on file   • Highest education level: Not on file   Tobacco Use   • Smoking status: Never Smoker   • Smokeless tobacco: Never Used   Substance and Sexual Activity   • Alcohol use: No   • Drug use: No   Social History Narrative    Lives in [place] w/.     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    Physical Exam   Constitutional: He is oriented to person, place, and time. He appears well-developed and well-nourished. No distress.   HENT:   Head: Normocephalic and atraumatic.   Mouth/Throat: No oropharyngeal exudate.   Eyes: Pupils are equal, round, and reactive to light. Conjunctivae and EOM are normal.   Pulmonary/Chest: Effort normal. No respiratory distress.   Neurological: He is alert and oriented to person, place, and time.   Skin: No rash noted.         Assessment:  3.5 years s/p LSG Dr. Christiansen on 10/26/16    ICD-10-CM ICD-9-CM   1. Status post bariatric surgery Z98.84 V45.86   2. Malnutrition screen Z13.21 V77.2   3. Hypovitaminosis D E55.9 268.9   4. Screening, iron deficiency anemia Z13.0 V78.0   5. Gastroesophageal reflux disease, esophagitis presence not specified K21.9 530.81   6. Postgastrectomy malabsorption K91.2 579.3    Z90.3          Plan:  Doing well. Continue w/ good food choices and healthy habits.  Continue protein >70g/day.  Continue routine exercise.  Routine bariatric labs ordered.  Continue vitamins w/ adjustments pending lab results.  Call w/ problems/concerns.     This visit was conducted as a video visit, in an effort to limit spread of the novel coronavirus during the 2020 pandemic.      The patient was instructed to follow up in 1 year, sooner if needed.    Refill omeprazole x 1 year.    note: approx 15 of the 25 minute visit was spent  counseling on nutrition and necessary dietary/lifestyle modifications face to face.    Bozena Lim MD